# Patient Record
Sex: FEMALE
[De-identification: names, ages, dates, MRNs, and addresses within clinical notes are randomized per-mention and may not be internally consistent; named-entity substitution may affect disease eponyms.]

---

## 2024-08-26 ENCOUNTER — APPOINTMENT (OUTPATIENT)
Dept: VASCULAR SURGERY | Facility: CLINIC | Age: 64
End: 2024-08-26
Payer: MEDICAID

## 2024-08-26 VITALS
HEIGHT: 63 IN | SYSTOLIC BLOOD PRESSURE: 126 MMHG | DIASTOLIC BLOOD PRESSURE: 72 MMHG | HEART RATE: 64 BPM | WEIGHT: 127 LBS | BODY MASS INDEX: 22.5 KG/M2

## 2024-08-26 PROBLEM — Z00.00 ENCOUNTER FOR PREVENTIVE HEALTH EXAMINATION: Status: ACTIVE | Noted: 2024-08-26

## 2024-08-26 PROCEDURE — 99204 OFFICE O/P NEW MOD 45 MIN: CPT | Mod: 25

## 2024-08-26 PROCEDURE — G2211 COMPLEX E/M VISIT ADD ON: CPT | Mod: NC

## 2024-08-28 NOTE — ASSESSMENT
[FreeTextEntry1] : 64-year-old female with symptomatic varicose veins of the left lower extremity.  Pedal pulses are intact bilaterally.  There is no evidence of significant arterial sufficiency at this time.  Suspect venous insufficiency.  Recommend compression and elevation.  Patient to return to office for venous duplex of left lower extremity.

## 2024-08-28 NOTE — HISTORY OF PRESENT ILLNESS
[FreeTextEntry1] : Patient is a 64-year-old female with history significant for hyperlipidemia presents to the office today for evaluation of left lower extremity varicosities.  Patient reports her varicosities are associated with intermittent swelling.  Patient does not currently use compression stockings.  Denies fever or chills.  Denies chest pain or shortness of breath.  Denies rest pain or claudication symptoms.  Denies tissue loss or bleeding varicosities.  No history of MI or CVA.  No history of DVT or PE.  No history of smoking.

## 2024-08-28 NOTE — PHYSICAL EXAM
[Normal Breath Sounds] : Normal breath sounds [Normal Rate and Rhythm] : normal rate and rhythm [2+] : left 2+ [Varicose Veins Of Lower Extremities] : present [Varicose Veins Of The Left Leg] : of the left leg [Ankle Swelling On The Left] : moderate [No Rash or Lesion] : No rash or lesion [Alert] : alert [Calm] : calm [Ankle Swelling (On Exam)] : not present [] : not present [de-identified] : Appears well, no acute distress noted [de-identified] : No palpable cords.  No calf tenderness. [de-identified] : Intact

## 2024-08-28 NOTE — PHYSICAL EXAM
[Normal Breath Sounds] : Normal breath sounds [Normal Rate and Rhythm] : normal rate and rhythm [2+] : left 2+ [Varicose Veins Of Lower Extremities] : present [Varicose Veins Of The Left Leg] : of the left leg [Ankle Swelling On The Left] : moderate [No Rash or Lesion] : No rash or lesion [Alert] : alert [Calm] : calm [Ankle Swelling (On Exam)] : not present [] : not present [de-identified] : Appears well, no acute distress noted [de-identified] : No palpable cords.  No calf tenderness. [de-identified] : Intact

## 2024-09-11 ENCOUNTER — APPOINTMENT (OUTPATIENT)
Dept: VASCULAR SURGERY | Facility: CLINIC | Age: 64
End: 2024-09-11
Payer: MEDICAID

## 2024-09-11 DIAGNOSIS — I87.2 VENOUS INSUFFICIENCY (CHRONIC) (PERIPHERAL): ICD-10-CM

## 2024-09-11 DIAGNOSIS — I83.892 VARICOSE VEINS OF LEFT LOWER EXTREMITY WITH OTHER COMPLICATIONS: ICD-10-CM

## 2024-09-11 PROCEDURE — 93971 EXTREMITY STUDY: CPT | Mod: LT
